# Patient Record
Sex: MALE | Race: WHITE
[De-identification: names, ages, dates, MRNs, and addresses within clinical notes are randomized per-mention and may not be internally consistent; named-entity substitution may affect disease eponyms.]

---
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
## 2021-01-29 ENCOUNTER — TRANSCRIPTION ENCOUNTER (OUTPATIENT)
Age: 21
End: 2021-01-29

## 2021-10-04 ENCOUNTER — APPOINTMENT (OUTPATIENT)
Dept: CARDIOLOGY | Facility: CLINIC | Age: 21
End: 2021-10-04
Payer: COMMERCIAL

## 2021-10-04 ENCOUNTER — NON-APPOINTMENT (OUTPATIENT)
Age: 21
End: 2021-10-04

## 2021-10-04 VITALS
HEIGHT: 69 IN | DIASTOLIC BLOOD PRESSURE: 72 MMHG | WEIGHT: 167 LBS | OXYGEN SATURATION: 98 % | BODY MASS INDEX: 24.73 KG/M2 | TEMPERATURE: 97.6 F | SYSTOLIC BLOOD PRESSURE: 114 MMHG | HEART RATE: 84 BPM

## 2021-10-04 DIAGNOSIS — Z00.00 ENCOUNTER FOR GENERAL ADULT MEDICAL EXAMINATION W/OUT ABNORMAL FINDINGS: ICD-10-CM

## 2021-10-04 DIAGNOSIS — U07.1 COVID-19: ICD-10-CM

## 2021-10-04 PROCEDURE — 99203 OFFICE O/P NEW LOW 30 MIN: CPT

## 2021-10-04 PROCEDURE — 93000 ELECTROCARDIOGRAM COMPLETE: CPT

## 2021-10-04 NOTE — DISCUSSION/SUMMARY
[Patient] : the patient [Parent] : the parent [With PCP] : with ~his/her~ primary care provider [FreeTextEntry1] : No cardiac contraindications to participate in full contact sports.  No evidence of any significant cardiac issues.

## 2021-10-04 NOTE — CARDIOLOGY SUMMARY
[de-identified] : 10/4/2021, normal sinus rhythm with repolarization abnormalities likely juvenile pattern.

## 2021-10-04 NOTE — HISTORY OF PRESENT ILLNESS
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  As per the patient, had Covid in February, 2021 which was generally mild.,  Denied any cardiac issues in relation to his fairly asymptomatic Covid infection.  Since then he has had no residual complaints.\par \par No significant cardiac family history, no history of sudden cardiac death in family.  Does have a distant history of exercise-induced asthma from when he was younger but does not use an inhaler actively.  No history of hypertension, hyperlipidemia or diabetes.

## 2022-05-13 ENCOUNTER — APPOINTMENT (OUTPATIENT)
Dept: PEDIATRICS | Facility: CLINIC | Age: 22
End: 2022-05-13
Payer: COMMERCIAL

## 2022-05-13 VITALS — OXYGEN SATURATION: 98 % | TEMPERATURE: 98.5 F | WEIGHT: 169.3 LBS | HEART RATE: 87 BPM

## 2022-05-13 DIAGNOSIS — H66.92 OTITIS MEDIA, UNSPECIFIED, LEFT EAR: ICD-10-CM

## 2022-05-13 DIAGNOSIS — J06.9 ACUTE UPPER RESPIRATORY INFECTION, UNSPECIFIED: ICD-10-CM

## 2022-05-13 PROCEDURE — 99213 OFFICE O/P EST LOW 20 MIN: CPT

## 2022-05-13 RX ORDER — AMOXICILLIN 875 MG/1
875 TABLET, FILM COATED ORAL
Qty: 20 | Refills: 0 | Status: ACTIVE | COMMUNITY
Start: 2022-05-13 | End: 1900-01-01

## 2022-05-13 RX ORDER — METHYLPREDNISOLONE 4 MG/1
4 TABLET ORAL
Qty: 1 | Refills: 0 | Status: ACTIVE | COMMUNITY
Start: 2022-05-13 | End: 1900-01-01

## 2022-05-13 NOTE — DISCUSSION/SUMMARY
[FreeTextEntry1] : 20 yo boy here for cough for 3 weeks. father put him on zithromax a week ago that he ended 6 days ago. He is still coughing. Took home covid test last week and was negative. On exam lungs are clear. Left ear is infected. will treat with medrol dose pack and amoxil . call prn. use albuterol as needed.

## 2022-05-13 NOTE — BEGINNING OF VISIT
[Patient] : patient [FreeTextEntry1] : 22 yo boy here for cough for 3 weeks . father(foot doctor) put him on antibiotics a week ago for 6 days. (zithromax. )  no pain in chest. coughing, productive. took decongestant, nasal steroid and cough medicine with codeine. used inhaler when he had a coughing fit.

## 2022-05-13 NOTE — PHYSICAL EXAM
[NL] : warm [Clear Rhinorrhea] : clear rhinorrhea [FreeTextEntry3] : right tm clear. left tm inflamed, some pus [de-identified] : throat clear.  [FreeTextEntry7] : clear lungs. dry cough. did not cough until I asked him to cough.

## 2022-05-15 LAB — SARS-COV-2 N GENE NPH QL NAA+PROBE: NOT DETECTED

## 2022-05-15 RX ORDER — DOXYCYCLINE HYCLATE 100 MG/1
100 TABLET ORAL
Qty: 30 | Refills: 0 | Status: ACTIVE | COMMUNITY
Start: 2021-12-09

## 2022-05-15 RX ORDER — FLUOCINONIDE 0.5 MG/ML
0.05 SOLUTION TOPICAL
Qty: 60 | Refills: 0 | Status: ACTIVE | COMMUNITY
Start: 2022-02-02

## 2022-05-15 RX ORDER — KETOCONAZOLE 20.5 MG/ML
2 SHAMPOO, SUSPENSION TOPICAL
Qty: 120 | Refills: 0 | Status: ACTIVE | COMMUNITY
Start: 2022-02-02

## 2022-05-15 RX ORDER — ERYTHROMYCIN 20 MG/ML
2 SOLUTION TOPICAL
Qty: 60 | Refills: 0 | Status: ACTIVE | COMMUNITY
Start: 2021-12-09

## 2022-05-15 RX ORDER — TRETINOIN 0.25 MG/G
0.03 CREAM TOPICAL
Qty: 20 | Refills: 0 | Status: ACTIVE | COMMUNITY
Start: 2021-12-09

## 2022-05-15 RX ORDER — ALBUTEROL SULFATE 90 UG/1
108 (90 BASE) INHALANT RESPIRATORY (INHALATION)
Qty: 1 | Refills: 3 | Status: ACTIVE | COMMUNITY
Start: 2022-05-15 | End: 1900-01-01
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
